# Patient Record
Sex: MALE | Race: WHITE | NOT HISPANIC OR LATINO | Employment: OTHER | URBAN - METROPOLITAN AREA
[De-identification: names, ages, dates, MRNs, and addresses within clinical notes are randomized per-mention and may not be internally consistent; named-entity substitution may affect disease eponyms.]

---

## 2020-09-07 ENCOUNTER — HOSPITAL ENCOUNTER (EMERGENCY)
Facility: HOSPITAL | Age: 60
Discharge: HOME | End: 2020-09-07
Attending: NEUROLOGICAL SURGERY | Admitting: NEUROLOGICAL SURGERY
Payer: MEDICAID

## 2020-09-07 ENCOUNTER — HOSPITAL ENCOUNTER (EMERGENCY)
Facility: HOSPITAL | Age: 60
Discharge: ANOTHER HOSPITAL | End: 2020-09-07
Attending: EMERGENCY MEDICINE
Payer: MEDICAID

## 2020-09-07 VITALS
HEART RATE: 80 BPM | TEMPERATURE: 99 F | BODY MASS INDEX: 36.73 KG/M2 | RESPIRATION RATE: 19 BRPM | HEIGHT: 69 IN | WEIGHT: 248 LBS | DIASTOLIC BLOOD PRESSURE: 96 MMHG | OXYGEN SATURATION: 96 % | SYSTOLIC BLOOD PRESSURE: 169 MMHG

## 2020-09-07 VITALS
TEMPERATURE: 99 F | WEIGHT: 245 LBS | SYSTOLIC BLOOD PRESSURE: 171 MMHG | DIASTOLIC BLOOD PRESSURE: 106 MMHG | OXYGEN SATURATION: 98 % | HEART RATE: 74 BPM | RESPIRATION RATE: 18 BRPM

## 2020-09-07 DIAGNOSIS — H60.502 ACUTE OTITIS EXTERNA OF LEFT EAR, UNSPECIFIED TYPE: Primary | ICD-10-CM

## 2020-09-07 DIAGNOSIS — G93.89 BRAIN MASS: Primary | ICD-10-CM

## 2020-09-07 DIAGNOSIS — R55 SYNCOPE: ICD-10-CM

## 2020-09-07 DIAGNOSIS — M25.529 ELBOW PAIN: ICD-10-CM

## 2020-09-07 DIAGNOSIS — L03.211 FACIAL CELLULITIS: ICD-10-CM

## 2020-09-07 DIAGNOSIS — H66.92 LEFT OTITIS MEDIA, UNSPECIFIED OTITIS MEDIA TYPE: ICD-10-CM
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
## 2020-09-07 LAB
ALBUMIN SERPL BCP-MCNC: 4.1 G/DL (ref 3.5–5.2)
ALP SERPL-CCNC: 61 U/L (ref 38–126)
ALT SERPL W/O P-5'-P-CCNC: 76 U/L (ref 10–44)
ANION GAP SERPL CALC-SCNC: 6 MMOL/L (ref 8–16)
AST SERPL-CCNC: 37 U/L (ref 15–46)
BASOPHILS # BLD AUTO: 0.04 K/UL (ref 0–0.2)
BASOPHILS NFR BLD: 0.5 % (ref 0–1.9)
BILIRUB SERPL-MCNC: 0.8 MG/DL (ref 0.1–1)
BILIRUB UR QL STRIP: NEGATIVE
BUN SERPL-MCNC: 13 MG/DL (ref 2–20)
CALCIUM SERPL-MCNC: 9.1 MG/DL (ref 8.7–10.5)
CHLORIDE SERPL-SCNC: 101 MMOL/L (ref 95–110)
CLARITY UR REFRACT.AUTO: CLEAR
CO2 SERPL-SCNC: 32 MMOL/L (ref 23–29)
COLOR UR AUTO: ABNORMAL
CREAT SERPL-MCNC: 1.14 MG/DL (ref 0.5–1.4)
DIFFERENTIAL METHOD: ABNORMAL
EOSINOPHIL # BLD AUTO: 0.1 K/UL (ref 0–0.5)
EOSINOPHIL NFR BLD: 1.5 % (ref 0–8)
ERYTHROCYTE [DISTWIDTH] IN BLOOD BY AUTOMATED COUNT: 11.9 % (ref 11.5–14.5)
EST. GFR  (AFRICAN AMERICAN): >60 ML/MIN/1.73 M^2
EST. GFR  (NON AFRICAN AMERICAN): >60 ML/MIN/1.73 M^2
GLUCOSE SERPL-MCNC: 94 MG/DL (ref 70–110)
GLUCOSE UR QL STRIP: NEGATIVE
HCT VFR BLD AUTO: 43.6 % (ref 40–54)
HGB BLD-MCNC: 15 G/DL (ref 14–18)
HGB UR QL STRIP: NEGATIVE
IMM GRANULOCYTES # BLD AUTO: 0.02 K/UL (ref 0–0.04)
IMM GRANULOCYTES NFR BLD AUTO: 0.2 % (ref 0–0.5)
KETONES UR QL STRIP: NEGATIVE
LEUKOCYTE ESTERASE UR QL STRIP: NEGATIVE
LYMPHOCYTES # BLD AUTO: 1.9 K/UL (ref 1–4.8)
LYMPHOCYTES NFR BLD: 22.2 % (ref 18–48)
MCH RBC QN AUTO: 31.6 PG (ref 27–31)
MCHC RBC AUTO-ENTMCNC: 34.4 G/DL (ref 32–36)
MCV RBC AUTO: 92 FL (ref 82–98)
MONOCYTES # BLD AUTO: 0.7 K/UL (ref 0.3–1)
MONOCYTES NFR BLD: 8.6 % (ref 4–15)
NEUTROPHILS # BLD AUTO: 5.7 K/UL (ref 1.8–7.7)
NEUTROPHILS NFR BLD: 67 % (ref 38–73)
NITRITE UR QL STRIP: NEGATIVE
NRBC BLD-RTO: 0 /100 WBC
NT-PROBNP: 272 PG/ML (ref 5–900)
PH UR STRIP: 6 [PH] (ref 5–8)
PLATELET # BLD AUTO: 208 K/UL (ref 150–350)
PMV BLD AUTO: 10.1 FL (ref 9.2–12.9)
POCT GLUCOSE: 85 MG/DL (ref 70–110)
POTASSIUM SERPL-SCNC: 3.7 MMOL/L (ref 3.5–5.1)
PROT SERPL-MCNC: 7.4 G/DL (ref 6–8.4)
PROT UR QL STRIP: NEGATIVE
RBC # BLD AUTO: 4.75 M/UL (ref 4.6–6.2)
SARS-COV-2 RDRP RESP QL NAA+PROBE: NEGATIVE
SODIUM SERPL-SCNC: 139 MMOL/L (ref 136–145)
SP GR UR STRIP: 1.02 (ref 1–1.03)
TROPONIN I SERPL DL<=0.01 NG/ML-MCNC: <0.012 NG/ML (ref 0.01–0.03)
URN SPEC COLLECT METH UR: ABNORMAL
UROBILINOGEN UR STRIP-ACNC: NEGATIVE EU/DL
WBC # BLD AUTO: 8.51 K/UL (ref 3.9–12.7)

## 2020-09-07 PROCEDURE — 93005 ELECTROCARDIOGRAM TRACING: CPT | Mod: ER

## 2020-09-07 PROCEDURE — 99285 PR EMERGENCY DEPT VISIT,LEVEL V: ICD-10-PCS | Mod: ,,, | Performed by: EMERGENCY MEDICINE

## 2020-09-07 PROCEDURE — 93010 EKG 12-LEAD: ICD-10-PCS | Mod: ,,, | Performed by: INTERNAL MEDICINE

## 2020-09-07 PROCEDURE — 84484 ASSAY OF TROPONIN QUANT: CPT | Mod: ER

## 2020-09-07 PROCEDURE — 80053 COMPREHEN METABOLIC PANEL: CPT | Mod: ER

## 2020-09-07 PROCEDURE — 85025 COMPLETE CBC W/AUTO DIFF WBC: CPT | Mod: ER

## 2020-09-07 PROCEDURE — 99285 EMERGENCY DEPT VISIT HI MDM: CPT | Mod: ,,, | Performed by: EMERGENCY MEDICINE

## 2020-09-07 PROCEDURE — 99285 EMERGENCY DEPT VISIT HI MDM: CPT | Mod: 25

## 2020-09-07 PROCEDURE — 93010 ELECTROCARDIOGRAM REPORT: CPT | Mod: ,,, | Performed by: INTERNAL MEDICINE

## 2020-09-07 PROCEDURE — U0002 COVID-19 LAB TEST NON-CDC: HCPCS | Mod: ER

## 2020-09-07 PROCEDURE — 63600175 PHARM REV CODE 636 W HCPCS: Mod: ER | Performed by: PHYSICIAN ASSISTANT

## 2020-09-07 PROCEDURE — 83880 ASSAY OF NATRIURETIC PEPTIDE: CPT | Mod: ER

## 2020-09-07 PROCEDURE — 99285 EMERGENCY DEPT VISIT HI MDM: CPT | Mod: 25,27,ER

## 2020-09-07 PROCEDURE — 25500020 PHARM REV CODE 255: Performed by: EMERGENCY MEDICINE

## 2020-09-07 PROCEDURE — 25000003 PHARM REV CODE 250: Mod: ER | Performed by: EMERGENCY MEDICINE

## 2020-09-07 PROCEDURE — 99203 OFFICE O/P NEW LOW 30 MIN: CPT | Mod: ,,, | Performed by: NEUROLOGICAL SURGERY

## 2020-09-07 PROCEDURE — 96372 THER/PROPH/DIAG INJ SC/IM: CPT | Mod: 59,ER

## 2020-09-07 PROCEDURE — A9585 GADOBUTROL INJECTION: HCPCS | Performed by: EMERGENCY MEDICINE

## 2020-09-07 PROCEDURE — 25000003 PHARM REV CODE 250: Mod: ER | Performed by: PHYSICIAN ASSISTANT

## 2020-09-07 PROCEDURE — 81003 URINALYSIS AUTO W/O SCOPE: CPT | Mod: ER

## 2020-09-07 PROCEDURE — 99203 PR OFFICE/OUTPT VISIT, NEW, LEVL III, 30-44 MIN: ICD-10-PCS | Mod: ,,, | Performed by: NEUROLOGICAL SURGERY

## 2020-09-07 RX ORDER — CLINDAMYCIN PHOSPHATE 150 MG/ML
600 INJECTION, SOLUTION INTRAVENOUS
Status: COMPLETED | OUTPATIENT
Start: 2020-09-07 | End: 2020-09-07

## 2020-09-07 RX ORDER — CEFTRIAXONE 250 MG/1
1000 INJECTION, POWDER, FOR SOLUTION INTRAMUSCULAR; INTRAVENOUS
Status: COMPLETED | OUTPATIENT
Start: 2020-09-07 | End: 2020-09-07

## 2020-09-07 RX ORDER — CIPROFLOXACIN AND DEXAMETHASONE 3; 1 MG/ML; MG/ML
4 SUSPENSION/ DROPS AURICULAR (OTIC) 2 TIMES DAILY
Qty: 7.5 ML | Refills: 0 | Status: SHIPPED | OUTPATIENT
Start: 2020-09-07 | End: 2020-12-03 | Stop reason: ALTCHOICE

## 2020-09-07 RX ORDER — GADOBUTROL 604.72 MG/ML
10 INJECTION INTRAVENOUS
Status: COMPLETED | OUTPATIENT
Start: 2020-09-07 | End: 2020-09-07

## 2020-09-07 RX ADMIN — CEFTRIAXONE SODIUM 1000 MG: 250 INJECTION, POWDER, FOR SOLUTION INTRAMUSCULAR; INTRAVENOUS at 12:09

## 2020-09-07 RX ADMIN — GADOBUTROL 10 ML: 604.72 INJECTION INTRAVENOUS at 09:09

## 2020-09-07 RX ADMIN — CLINDAMYCIN PHOSPHATE 600 MG: 150 INJECTION, SOLUTION INTRAMUSCULAR; INTRAVENOUS at 12:09

## 2020-09-07 RX ADMIN — SODIUM CHLORIDE 1000 ML: 0.9 INJECTION, SOLUTION INTRAVENOUS at 02:09

## 2020-09-07 NOTE — ED NOTES
Hourly rounding complete. Patient resting in stretcher and is in NAD at this time. Pt is awake alert and oriented x4, VSS. Pt updated on POC. Bed low and locked, SR up x2, call bell in patient reach. Repositions self in stretcher for comfort. Pt voices no needs at this time.

## 2020-09-07 NOTE — ED PROVIDER NOTES
Encounter Date: 9/7/2020       History     Chief Complaint   Patient presents with    Transfer     Transfer from [facility], seen for left ear pina, syncope after IM injection, + head trama right elbow, CT shos Left sided MASS     Orlando Franklin is a 59 M hx of hypertension transferred from [facility] for abnormal CT head.  Patient states he was seen today for left ear pain which has been constant for the past 3 days.  Pain is been constant, feels like he cannot hear out of his left ear.  He tried ear drops last night which did not improve symptoms.  After being evaluated, patient was given injection which caused him to pass out.  He fell hitting his head and right elbow.  CT head was performed which shows a vague mass in the left medial temporal lobe with surrounding edema.  Patient was transferred for MRI and possible neuro surgical evaluation.  He currently denies headache, change in vision, memory change, personality change, loss of time.  No unintentional weight loss.  No numbness tingling of the arms or the legs.  He does report some fatigue.        Review of patient's allergies indicates:   Allergen Reactions    Ginger     Poison ivy [vit e-nonoxynol 9-aloe vera]      Past Medical History:   Diagnosis Date    Hypertension      History reviewed. No pertinent surgical history.  History reviewed. No pertinent family history.  Social History     Tobacco Use    Smoking status: Never Smoker    Smokeless tobacco: Never Used   Substance Use Topics    Alcohol use: Yes     Comment: mark 1-2 drink     Drug use: Not Currently     Review of Systems   Constitutional: Positive for fatigue. Negative for diaphoresis and fever.   HENT: Positive for ear pain. Negative for ear discharge and sore throat.    Respiratory: Negative for cough and shortness of breath.    Cardiovascular: Negative for chest pain.   Gastrointestinal: Negative for abdominal pain and nausea.   Genitourinary: Negative for dysuria.    Musculoskeletal: Negative for back pain and gait problem.   Skin: Negative for rash.   Neurological: Negative for weakness.   Hematological: Does not bruise/bleed easily.       Physical Exam     Initial Vitals [09/07/20 1631]   BP Pulse Resp Temp SpO2   (!) 156/93 81 16 98.9 °F (37.2 °C) 97 %      MAP       --         Physical Exam    Nursing note and vitals reviewed.  Constitutional: He appears well-developed. No distress.   HENT:   Right Ear: Hearing, tympanic membrane, external ear and ear canal normal. No mastoid tenderness.   Left Ear: There is swelling. No mastoid tenderness. Decreased hearing is noted.   Mouth/Throat: Oropharynx is clear and moist.   Eyes: Conjunctivae and EOM are normal. Pupils are equal, round, and reactive to light.   Neck: Neck supple.   Cardiovascular: Normal rate, regular rhythm and intact distal pulses.   Pulmonary/Chest: Breath sounds normal. No stridor. He has no wheezes. He has no rales.   Abdominal: Soft. Bowel sounds are normal. There is no abdominal tenderness. There is no rebound.   Musculoskeletal: No edema.   Neurological: He is alert and oriented to person, place, and time. He has normal strength. No cranial nerve deficit or sensory deficit.   Visual fields intact.  No extremity drift   Skin: Skin is warm. No rash noted.   Psychiatric: He has a normal mood and affect. Thought content normal.         ED Course   Procedures  Labs Reviewed - No data to display       Imaging Results    None          Medical Decision Making:   History:   Old Medical Records: I decided to obtain old medical records.  Old Records Summarized: records from previous admission(s).       <> Summary of Records: Labs from previous hospital reviewed with no acute process.  Initial Assessment:   Emergent evaluation 59-year-old male presenting with left ear pain, syncopized at freestanding ER after injection, with resultant head CT showing possible vague mass.  He is here for further evaluation.  Vital  signs reviewed, mild hypertension otherwise stable.  Well appearing, no acute distress.  Neurologically intact  Differential Diagnosis:   Mass, metastatic disease, abscess, artifact  Clinical Tests:   Lab Tests: Ordered and Reviewed  Radiological Study: Ordered and Reviewed  Medical Tests: Reviewed and Ordered  ED Management:  Discussed with Neurosurgery, he will evaluate the patient.  MRI with and without contrast ordered.    8:45 PM  Pt signed out to Dr. Briceno stable condition pending MRI brain, neurosurgery consult and final disposition.   Other:   I have discussed this case with another health care provider.       <> Summary of the Discussion: Neurosurgery                                 Clinical Impression:       ICD-10-CM ICD-9-CM   1. Acute otitis externa of left ear, unspecified type  H60.502 380.10                                Dianna Miles MD  09/10/20 1643
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
## 2020-09-07 NOTE — ED PROVIDER NOTES
Chief Complaint  Chief Complaint   Patient presents with    Otalgia     left ear pressure and it feels swollen shut.        HPI  Orlando Franklin is a 59 y.o. male who presents with left ear pain and swelling.  Patient reports that he has had some drainage from the left ear.  He has not had any fever or vomiting or diarrhea.  No trauma.  He reports this is gradually worsening over the last several days.  He has mild discomfort but it is more a sensation of fullness and swelling.  No exacerbating or relieving factors.  Patient does report some mild drainage from the ear canal but it is not bloody or cuba pus.  He may have had this many years ago but he is unsure with the previous course resulted in or a diagnosis or treatment.  No other deficits or symptoms at home.  No particular weakness or falls.    Past medical history  Past Medical History:   Diagnosis Date    Hypertension        Current Medications  No current facility-administered medications for this encounter.     Current Outpatient Medications:     LISINOPRIL-HYDROCHLOROTHIAZIDE ORAL, Take by mouth., Disp: , Rfl:     ciprofloxacin-dexamethasone 0.3-0.1% (CIPRODEX) 0.3-0.1 % DrpS, Place 4 drops into the left ear 2 (two) times daily., Disp: 7.5 mL, Rfl: 0    Allergies  Review of patient's allergies indicates:   Allergen Reactions    Ginger     Poison ivy [vit e-nonoxynol 9-aloe vera]        Surgical history  History reviewed. No pertinent surgical history.    Social history  Social History     Socioeconomic History    Marital status:      Spouse name: Not on file    Number of children: Not on file    Years of education: Not on file    Highest education level: Not on file   Occupational History    Not on file   Social Needs    Financial resource strain: Not on file    Food insecurity     Worry: Not on file     Inability: Not on file    Transportation needs     Medical: Not on file     Non-medical: Not on file   Tobacco Use    Smoking status:  Never Smoker    Smokeless tobacco: Never Used   Substance and Sexual Activity    Alcohol use: Yes     Comment: mark 1-2 drink     Drug use: Not Currently    Sexual activity: Not on file   Lifestyle    Physical activity     Days per week: Not on file     Minutes per session: Not on file    Stress: Not on file   Relationships    Social connections     Talks on phone: Not on file     Gets together: Not on file     Attends Congregation service: Not on file     Active member of club or organization: Not on file     Attends meetings of clubs or organizations: Not on file     Relationship status: Not on file   Other Topics Concern    Not on file   Social History Narrative    Not on file       Family History  History reviewed. No pertinent family history.    Review of systems  Respiratory: No wheezing, cough, or shortness of breath.  Cardiovascular: No chest pain or palpitations.  GI: No abdominal pain, nausea, vomiting, or diarrhea.  All systems otherwise negative except as noted in ROS and HPI    Physical Exam  Vital signs: BP (!) 171/106   Pulse 74   Temp 98.7 °F (37.1 °C) (Oral)   Resp 18   Wt 111.1 kg (245 lb)   SpO2 98%   Constitutional: No acute distress.  Well developed, alert, oriented and appropriate.  HENT: Normocephalic, atraumatic.  There is left ear redness and edema consistent with mild cellulitis.  No abscess.  No significant tenderness.  No mastoiditis.  Ear canal does appear congested but not consistent with otitis externa.  No pain with movement of the auricle.  There does appear to be otitis media but his canal is tight and it is difficult to see fully.  Eyes: PERRL, EOMI, normal conjunctiva.  Neck: Normal range of motion, no tenderness; supple.  Respiratory: Nonlabored breathing with normal breath sounds.  Cardiovascular: RRR with no pulse deficit.  GI: Soft, nontender, no rebound or guarding.  Musculoskeletal: Normal ROM, no tenderness, injury, or edema.  Skin: Warm, dry skin without  infection or injury.  Neurologic: Normal motor, sensation with no new focal deficit.  Psychiatric: Affect normal, judgement normal, mood normal.  No SI, HI, and not gravely disabled.    Labs  Pertinent labs reviewed (see chart for details)  Labs Reviewed   CBC W/ AUTO DIFFERENTIAL - Abnormal; Notable for the following components:       Result Value    Mean Corpuscular Hemoglobin 31.6 (*)     All other components within normal limits   COMPREHENSIVE METABOLIC PANEL - Abnormal; Notable for the following components:    CO2 32 (*)     ALT 76 (*)     Anion Gap 6 (*)     All other components within normal limits   URINALYSIS, REFLEX TO URINE CULTURE - Abnormal; Notable for the following components:    Color, UA Brown (*)     All other components within normal limits    Narrative:     Specimen Source->Urine   NT-PRO NATRIURETIC PEPTIDE   TROPONIN I   SARS-COV-2 RNA AMPLIFICATION, QUAL   POCT GLUCOSE       ECG  Results for orders placed or performed during the hospital encounter of 09/07/20   EKG 12-lead    Collection Time: 09/07/20 12:26 PM    Narrative    Test Reason : R55,    Vent. Rate : 074 BPM     Atrial Rate : 074 BPM     P-R Int : 198 ms          QRS Dur : 098 ms      QT Int : 398 ms       P-R-T Axes : 055 -15 002 degrees     QTc Int : 441 ms    Normal sinus rhythm  Minimal voltage criteria for LVH, may be normal variant  Borderline Abnormal ECG  No previous ECGs available  Confirmed by Juanjo STARK MD, Bull ZAMORA ([id number]) on 9/8/2020 8:43:43 AM    Referred By: JOANNE   SELF           Confirmed By:Bull Geiger III, MD     ECG interpreted by ED MD    Radiology    X-Ray Cervical Spine AP And Lateral   Final Result      1.  Degenerative changes, greatest from C5 through C7.  No acute bony abnormality suggested.         Electronically signed by: Joey Saleem MD   Date:    09/07/2020   Time:    13:37      CT Head Without Contrast   Final Result   Addendum 1 of 1      Of note, the patient had a follow-up MRI brain  performed later the same    day which was normal.      The findings on the current CT scan were later shown to be artifact from a    defective tube on the CT scanner.  This caused the masslike appearance in    the left cerebral hemisphere.  This tube is currently being replaced.      Impression: As above         Electronically signed by: Joey Saleem MD   Date:    09/11/2020   Time:    14:05      Final      X-Ray Elbow Complete Right   Final Result      No acute bony abnormality suggested.         Electronically signed by: Joey Saleem MD   Date:    09/07/2020   Time:    13:12          Procedures  Procedures    Medications   clindamycin injection 600 mg (600 mg Intramuscular Given 9/7/20 1204)   cefTRIAXone injection 1,000 mg (1,000 mg Intramuscular Given 9/7/20 1200)   sodium chloride 0.9% bolus 1,000 mL (1,000 mLs Intravenous New Bag 9/7/20 1448)       ED course and medical decision making    ED Course as of Sep 14 0616   Mon Sep 07, 2020   1203 After patient received 3rd IM shot of abx, RN reports that the patient said he was feeling dizzy and subsequently had a syncopal episode hitting his head and elbow. Patient was unresponsive for about 30-45 seconds. Once patient awoken, he was AAOx3. No overt signs of trauma. Ordered CT head and x-rays.     [EM]   1300 CT head concerning for brain mass, negative for bleed/fractures. Patient care transferred to Dr. Gastelum.     [EM]   1359 D/w Neuro on call    [MB]   1426 EKG shows normal sinus rhythm rate of 74 beats per minute with no ST elevation MI.  Normal QTC    [MB]      ED Course User Index  [EM] Kellie Kaur PA-C  [MB] Gary Gastelum MD       While getting his injections of antibiotics for presumed otitis media and cellulitis, the patient had a syncopal episode.  He did strike his head and a CT scan was ordered.  No fracture or acute bleed seen.  Brain mass detected.  I discussed the case with neurology and Hospital Medicine and they do both  agree that patient may benefit from inpatient evaluation at this time.  [facility] wishes that neuro surgery be available and do not feel he is a candidate for their hospital because they do not have neurosurgery.  Will place a regional referral center referral for transfer to [facility] or other suitable facility    Disposition    Patient transferred in stable condition      Final impression  1. Brain mass    2. Elbow pain    3. Syncope    4. Fall    5. Left otitis media, unspecified otitis media type    6. Facial cellulitis        Critical care time spent with this patient was 30 minutes excluding the procedure time.          Gary Gastelum MD  09/07/20 1414       Gary Gastelum MD  09/07/20 1659       Gary Gastelum MD  09/14/20 0616

## 2020-09-07 NOTE — ED TRIAGE NOTES
Orlando Franklin, a 59 y.o. male presents to the ED via EMS transfer from [facility] with CC fall with head injury, reports passed out getting an injection by ENT for right ear pain x3 days. CT showed possible brain mass. Neuro intact. Denies HA dizziness or blurred vision. C/o neck soreness. Right elbow abraision and swelling noted, no active bleeding.  Also c/o lower back soreness.    Patient identifiers verified verbally with patient and correct for Orlando Franklin.    SKIN: Skin is warm dry and intact, and color is consistent with ethnicity. Capillary refill <3 seconds. No breakdown or brusing visible. Mucus membranes moist, acyanotic.  RESPIRATORY: Airway is open and patent. Respirations-spontaneous, unlabored, regular rate, equal bilaterally on inspiration and expiration. No accessory muscle use noted. Lungs clear to auscultation in all fields bilaterally anterior and posterior.   CARDIAC: Patient has regular heart rate.  No peripheral edema noted, and patient has no c/o chest pain. Peripheral pulses present equal and strong throughout.  ABDOMEN: Soft and non-tender to palpation with no distention noted. Normoactive bowel sounds x4 quadrants. Pt has no complaints of abnormal bowel movements, denies blood in stool. Pt reports normal appetite.   NEUROLOGIC: AAOx4. Eyes open spontaneously and facial expression symmetrical. Pt behavior appropriate to situation, and pt follows commands. Pt reports sensation present in all extremities when touched with a finger, denies any numbness or tingling. PERRLA  MUSCULOSKELETAL: Spontaneous movement noted to all extremities. Hand  equal and leg strength strong +5 bilaterally.   : No complaints of frequency, burning, urgency or blood in the urine. No complaints of incontinence.

## 2020-09-07 NOTE — ED NOTES
After giving the patient his third intramuscular injection he became light headed and dizzy and fell backwards hitting his head and right elbow. Patient had a syncopal episode and was unresponsive for approximately 30-45 seconds. Upon waking up the pt was awake and alert x4. Pt states he remember having his third shot and then becoming dizzy and that is the last thing he remembers.

## 2020-09-08 ENCOUNTER — TELEPHONE (OUTPATIENT)
Dept: EMERGENCY MEDICINE | Facility: HOSPITAL | Age: 60
End: 2020-09-08

## 2020-09-08 NOTE — DISCHARGE INSTRUCTIONS
The MRI and repeat CT scan of your head are unremarkable.  You do have signs of otitis externa and should use the Ciprodex ear drops as prescribed.    Seek immediate medical attention if you have new or worsening symptoms, or for any other concern.

## 2020-09-08 NOTE — SUBJECTIVE & OBJECTIVE
(Not in a hospital admission)      Review of patient's allergies indicates:   Allergen Reactions    Ginger     Poison ivy [vit e-nonoxynol 9-aloe vera]        Past Medical History:   Diagnosis Date    Hypertension      History reviewed. No pertinent surgical history.  Family History     None        Tobacco Use    Smoking status: Never Smoker    Smokeless tobacco: Never Used   Substance and Sexual Activity    Alcohol use: Yes     Comment: mark 1-2 drink     Drug use: Not Currently    Sexual activity: Not on file     Review of Systems   Constitutional: Negative.    HENT: Positive for ear pain and hearing loss. Negative for rhinorrhea and tinnitus.    Eyes: Negative for photophobia and pain.   Respiratory: Negative.    Cardiovascular: Negative for chest pain.   Endocrine: Negative.    Musculoskeletal: Positive for neck pain.   Neurological: Positive for syncope. Negative for seizures, speech difficulty and headaches.     Objective:     Weight: 112.5 kg (248 lb)  Body mass index is 36.62 kg/m².  Vital Signs (Most Recent):  Temp: 98.9 °F (37.2 °C) (09/07/20 1631)  Pulse: 78 (09/07/20 2201)  Resp: 20 (09/07/20 2201)  BP: (!) 154/85 (09/07/20 2031)  SpO2: 98 % (09/07/20 2201) Vital Signs (24h Range):  Temp:  [98.7 °F (37.1 °C)-99 °F (37.2 °C)] 98.9 °F (37.2 °C)  Pulse:  [70-87] 78  Resp:  [11-20] 20  SpO2:  [95 %-99 %] 98 %  BP: (150-177)/() 154/85                          Neurosurgery Physical Exam     GENERAL: appears stated age, well nourished, NAD  HEENT: NCAT, mucous membranes moist  NECK: supple, very mild TTP of cervical paraspinal musculature   CV: extremities warm  PULM: normal expansion    NEURO:  GCS 15  AAO x 3  CN II-XII grossly intact  PERRL, EOMi  fc x 4, FS  SILT    No drift or dysmetria      Significant Labs:  Recent Labs   Lab 09/07/20  1417   GLU 94      K 3.7      CO2 32*   BUN 13   CREATININE 1.14   CALCIUM 9.1     Recent Labs   Lab 09/07/20  1417   WBC 8.51   HGB 15.0   HCT  43.6        No results for input(s): LABPT, INR, APTT in the last 48 hours.  Microbiology Results (last 7 days)     ** No results found for the last 168 hours. **        All pertinent labs from the last 24 hours have been reviewed.    Significant Diagnostics:  I have reviewed all pertinent imaging results/findings within the past 24 hours.   X-ray Cervical Spine Ap And Lateral    Result Date: 9/7/2020  1.  Degenerative changes, greatest from C5 through C7.  No acute bony abnormality suggested. Electronically signed by: Joey Saleem MD Date:    09/07/2020 Time:    13:37    X-ray Elbow Complete Right    Result Date: 9/7/2020  No acute bony abnormality suggested. Electronically signed by: Joey Saleem MD Date:    09/07/2020 Time:    13:12    Ct Head Without Contrast    Result Date: 9/7/2020  No acute intracranial process. Case discussed with  on 09/07/2020 at 22:20. Electronically signed by: Nahid Mclaughlin MD Date:    09/07/2020 Time:    22:33    Ct Head Without Contrast    Result Date: 9/7/2020  1.  No definite evidence of a significant/acute intracranial hemorrhage.  No acute/depressed skull fracture. 2.  Suspicion of vague mass in the left medial temporal region with surrounding edema as described.  Minimal white matter changes elsewhere.  Question a 2nd focus left frontal region.  Brain tumor?  Metastatic lesion?  Recommend scheduling the patient for an MRI of the brain pre and post contrast administration. All CT scans at this facility are performed  using dose modulation techniques as appropriate to performed exam including the following:  automated exposure control; adjustment of mA and/or kV according to the patients size (this includes techniques or standardized protocols for targeted exams where dose is matched to indication/reason for exam: i.e. extremities or head);  iterative reconstruction technique. Electronically signed by: Joey Saleem MD Date:    09/07/2020  Time:    13:12    Mri Brain W Wo Contrast    Result Date: 9/7/2020  Unremarkable contrast enhanced MRI of the brain. Bilateral mastoid effusions with left greater than right. Please see the repeat CT scan of the head for additional details. Case discussed with Dr. Briceno on 09/07/2020 at 22:20. Electronically signed by resident: Roderick Jaramillo Date:    09/07/2020 Time:    21:24 Electronically signed by: Nahid Mclaughlin MD Date:    09/07/2020 Time:    22:36

## 2020-09-08 NOTE — HPI
59M with h/o HTN presents as transfer from OSH for L temporal mass found on CTH during workup for syncopal episode with mechanical GLF. Pt has been having L ear pain x 3 day which was dx as acute otitis media at outside ED. He was given three injections, and while receiving one of these, he experienced LOC with prodromal diaphoresis and palpitations. ED personnel reported that he fell, striking his head and elbow on the floor, and sustainced LOC for <1 minute. Upon awaking, he was fully alert and oriented. He is not currently on any AC/AP, and has no h/o prior syncopal episodes. He denies N/V, changes in vision/hearing, confusion, focal weakness or numbness.

## 2020-09-08 NOTE — CONSULTS
[facility]  Neurosurgery  Consult Note    Consults  Subjective:     Chief Complaint/Reason for Admission: Fall    History of Present Illness: 59M with h/o HTN presents as transfer from OSH for L temporal mass found on CTH during workup for syncopal episode with mechanical GLF. Pt has been having L ear pain x 3 day which was dx as acute otitis media at outside ED. He was given three injections, and while receiving one of these, he experienced LOC with prodromal diaphoresis and palpitations. ED personnel reported that he fell, striking his head and elbow on the floor, and sustainced LOC for <1 minute. Upon awaking, he was fully alert and oriented. He is not currently on any AC/AP, and has no h/o prior syncopal episodes. He denies N/V, changes in vision/hearing, confusion, focal weakness or numbness.    (Not in a hospital admission)      Review of patient's allergies indicates:   Allergen Reactions    Ginger     Poison ivy [vit e-nonoxynol 9-aloe vera]        Past Medical History:   Diagnosis Date    Hypertension      History reviewed. No pertinent surgical history.  Family History     None        Tobacco Use    Smoking status: Never Smoker    Smokeless tobacco: Never Used   Substance and Sexual Activity    Alcohol use: Yes     Comment: mark 1-2 drink     Drug use: Not Currently    Sexual activity: Not on file     Review of Systems   Constitutional: Negative.    HENT: Positive for ear pain and hearing loss. Negative for rhinorrhea and tinnitus.    Eyes: Negative for photophobia and pain.   Respiratory: Negative.    Cardiovascular: Negative for chest pain.   Endocrine: Negative.    Musculoskeletal: Positive for neck pain.   Neurological: Positive for syncope. Negative for seizures, speech difficulty and headaches.     Objective:     Weight: 112.5 kg (248 lb)  Body mass index is 36.62 kg/m².  Vital Signs (Most Recent):  Temp: 98.9 °F (37.2 °C) (09/07/20 1631)  Pulse: 78 (09/07/20 2201)  Resp:  20 (09/07/20 2201)  BP: (!) 154/85 (09/07/20 2031)  SpO2: 98 % (09/07/20 2201) Vital Signs (24h Range):  Temp:  [98.7 °F (37.1 °C)-99 °F (37.2 °C)] 98.9 °F (37.2 °C)  Pulse:  [70-87] 78  Resp:  [11-20] 20  SpO2:  [95 %-99 %] 98 %  BP: (150-177)/() 154/85                          Neurosurgery Physical Exam     GENERAL: appears stated age, well nourished, NAD  HEENT: NCAT, mucous membranes moist  NECK: supple, very mild TTP of cervical paraspinal musculature   CV: extremities warm  PULM: normal expansion    NEURO:  GCS 15  AAO x 3  CN II-XII grossly intact  PERRL, EOMi  fc x 4, FS  SILT    No drift or dysmetria      Significant Labs:  Recent Labs   Lab 09/07/20  1417   GLU 94      K 3.7      CO2 32*   BUN 13   CREATININE 1.14   CALCIUM 9.1     Recent Labs   Lab 09/07/20  1417   WBC 8.51   HGB 15.0   HCT 43.6        No results for input(s): LABPT, INR, APTT in the last 48 hours.  Microbiology Results (last 7 days)     ** No results found for the last 168 hours. **        All pertinent labs from the last 24 hours have been reviewed.    Significant Diagnostics:  I have reviewed all pertinent imaging results/findings within the past 24 hours.   X-ray Cervical Spine Ap And Lateral    Result Date: 9/7/2020  1.  Degenerative changes, greatest from C5 through C7.  No acute bony abnormality suggested. Electronically signed by: Joey Saleem MD Date:    09/07/2020 Time:    13:37    X-ray Elbow Complete Right    Result Date: 9/7/2020  No acute bony abnormality suggested. Electronically signed by: Joey Saleem MD Date:    09/07/2020 Time:    13:12    Ct Head Without Contrast    Result Date: 9/7/2020  No acute intracranial process. Case discussed with  on 09/07/2020 at 22:20. Electronically signed by: Nahid Mclaughlin MD Date:    09/07/2020 Time:    22:33    Ct Head Without Contrast    Result Date: 9/7/2020  1.  No definite evidence of a significant/acute intracranial hemorrhage.  No  acute/depressed skull fracture. 2.  Suspicion of vague mass in the left medial temporal region with surrounding edema as described.  Minimal white matter changes elsewhere.  Question a 2nd focus left frontal region.  Brain tumor?  Metastatic lesion?  Recommend scheduling the patient for an MRI of the brain pre and post contrast administration. All CT scans at this facility are performed  using dose modulation techniques as appropriate to performed exam including the following:  automated exposure control; adjustment of mA and/or kV according to the patients size (this includes techniques or standardized protocols for targeted exams where dose is matched to indication/reason for exam: i.e. extremities or head);  iterative reconstruction technique. Electronically signed by: Joey Saleem MD Date:    09/07/2020 Time:    13:12    Mri Brain W Wo Contrast    Result Date: 9/7/2020  Unremarkable contrast enhanced MRI of the brain. Bilateral mastoid effusions with left greater than right. Please see the repeat CT scan of the head for additional details. Case discussed with Dr. Briceno on 09/07/2020 at 22:20. Electronically signed by resident: Roderick Jaramillo Date:    09/07/2020 Time:    21:24 Electronically signed by: Nahid Mclaughlin MD Date:    09/07/2020 Time:    22:36      Assessment/Plan:     Fall  59M with h/o HTN presents with L AOM diagnosed at OSH ED. He received Abx injection there, and experienced a syncopal episode with prodromal diaphoresis.     --All labs and diagnostics reviewed.  --CTH from OSH demonstrates L temporal hyperdensity with surrounding hypoattenuation.   --MRI and repeat CTH unremarkable.   --CT Csp demonstrates no visible fracture, distraction/dislocation injury.  --CBC/BMP grossly wnl.   --Symptoms are consistent with vasovagal episode. Recommend outpt workup with PCP.   --No NSY intervention. Please page with any neuro exam change or questions.   --NSY will sign off. Thank you for this  interesting consult.     Dispo: per primary    Discussed with Dr. Childs.         Thank you for your consult. I will sign off. Please contact us if you have any additional questions.    Elmo Frederick MD  Neurosurgery  [facility]

## 2020-09-08 NOTE — TELEPHONE ENCOUNTER
Patient called informing his insurance would not cover Ciprodex gtt from his ED visit yesterday. Diagnosed with OAC. I called in Ciprofloxacin gtt to his preferred pharmacy ([facility] [phone])
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
## 2020-11-20 ENCOUNTER — HOSPITAL ENCOUNTER (EMERGENCY)
Facility: HOSPITAL | Age: 60
Discharge: HOME | End: 2020-11-20
Attending: FAMILY MEDICINE
Payer: MEDICAID

## 2020-11-20 VITALS
WEIGHT: 250 LBS | OXYGEN SATURATION: 97 % | SYSTOLIC BLOOD PRESSURE: 165 MMHG | DIASTOLIC BLOOD PRESSURE: 100 MMHG | BODY MASS INDEX: 35.79 KG/M2 | HEART RATE: 75 BPM | RESPIRATION RATE: 20 BRPM | HEIGHT: 70 IN | TEMPERATURE: 98 F

## 2020-11-20 DIAGNOSIS — S61.411A LACERATION OF RIGHT HAND WITHOUT FOREIGN BODY, INITIAL ENCOUNTER: Primary | ICD-10-CM

## 2020-11-20 DIAGNOSIS — M79.643 HAND PAIN: ICD-10-CM

## 2020-11-20 DIAGNOSIS — M79.639 FOREARM PAIN: ICD-10-CM

## 2020-11-20 PROCEDURE — 12001 RPR S/N/AX/GEN/TRNK 2.5CM/<: CPT | Mod: ER

## 2020-11-20 PROCEDURE — 90715 TDAP VACCINE 7 YRS/> IM: CPT | Mod: SL,ER | Performed by: PHYSICIAN ASSISTANT

## 2020-11-20 PROCEDURE — 99284 EMERGENCY DEPT VISIT MOD MDM: CPT | Mod: 25,ER

## 2020-11-20 PROCEDURE — 90471 IMMUNIZATION ADMIN: CPT | Mod: VFC,ER | Performed by: PHYSICIAN ASSISTANT

## 2020-11-20 PROCEDURE — 63600175 PHARM REV CODE 636 W HCPCS: Mod: SL,ER | Performed by: PHYSICIAN ASSISTANT

## 2020-11-20 PROCEDURE — 25000003 PHARM REV CODE 250: Mod: ER | Performed by: PHYSICIAN ASSISTANT

## 2020-11-20 RX ORDER — LIDOCAINE HYDROCHLORIDE 10 MG/ML
10 INJECTION, SOLUTION EPIDURAL; INFILTRATION; INTRACAUDAL; PERINEURAL
Status: COMPLETED | OUTPATIENT
Start: 2020-11-20 | End: 2020-11-20

## 2020-11-20 RX ADMIN — CLOSTRIDIUM TETANI TOXOID ANTIGEN (FORMALDEHYDE INACTIVATED), CORYNEBACTERIUM DIPHTHERIAE TOXOID ANTIGEN (FORMALDEHYDE INACTIVATED), BORDETELLA PERTUSSIS TOXOID ANTIGEN (GLUTARALDEHYDE INACTIVATED), BORDETELLA PERTUSSIS FILAMENTOUS HEMAGGLUTININ ANTIGEN (FORMALDEHYDE INACTIVATED), BORDETELLA PERTUSSIS PERTACTIN ANTIGEN, AND BORDETELLA PERTUSSIS FIMBRIAE 2/3 ANTIGEN 0.5 ML: 5; 2; 2.5; 5; 3; 5 INJECTION, SUSPENSION INTRAMUSCULAR at 03:11

## 2020-11-20 RX ADMIN — LIDOCAINE HYDROCHLORIDE 100 MG: 10 INJECTION, SOLUTION EPIDURAL; INFILTRATION; INTRACAUDAL; PERINEURAL at 04:11
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
## 2020-11-20 NOTE — DISCHARGE INSTRUCTIONS
Your x-rays did not reveal any evidence of fracture or dislocation.  You are advised to rest, elevate and ice the extremity.  You are instructed to follow-up with  Your primary care provider for re-evaluation and to return to the emergency department immediately for any new or worsening symptoms.

## 2020-11-21 ENCOUNTER — HOSPITAL ENCOUNTER (EMERGENCY)
Facility: HOSPITAL | Age: 60
Discharge: HOME | End: 2020-11-21
Attending: EMERGENCY MEDICINE
Payer: MEDICAID

## 2020-11-21 VITALS
TEMPERATURE: 99 F | WEIGHT: 250 LBS | OXYGEN SATURATION: 99 % | BODY MASS INDEX: 37.03 KG/M2 | DIASTOLIC BLOOD PRESSURE: 91 MMHG | HEIGHT: 69 IN | RESPIRATION RATE: 16 BRPM | HEART RATE: 90 BPM | SYSTOLIC BLOOD PRESSURE: 154 MMHG

## 2020-11-21 DIAGNOSIS — S61.411D LACERATION OF RIGHT HAND WITHOUT FOREIGN BODY, SUBSEQUENT ENCOUNTER: Primary | ICD-10-CM

## 2020-11-21 PROCEDURE — 99283 EMERGENCY DEPT VISIT LOW MDM: CPT | Mod: ER

## 2020-11-21 PROCEDURE — 25000003 PHARM REV CODE 250: Mod: ER | Performed by: EMERGENCY MEDICINE

## 2020-11-21 RX ORDER — CEPHALEXIN 500 MG/1
500 CAPSULE ORAL EVERY 12 HOURS
Qty: 14 CAPSULE | Refills: 0 | Status: SHIPPED | OUTPATIENT
Start: 2020-11-21 | End: 2020-11-28

## 2020-11-21 RX ORDER — CEPHALEXIN 500 MG/1
500 CAPSULE ORAL
Status: COMPLETED | OUTPATIENT
Start: 2020-11-21 | End: 2020-11-21

## 2020-11-21 RX ADMIN — CEPHALEXIN 500 MG: 500 CAPSULE ORAL at 10:11

## 2020-11-22 NOTE — ED NOTES
Wrapped left hand loosely with dominique prior to discharge after having pt cleanse hands thoroughly with warm, soapy water. Pt dried hands with a towel.

## 2020-11-22 NOTE — ED PROVIDER NOTES
"Encounter Date: 11/21/2020       History     Chief Complaint   Patient presents with    Wound Check     laceration repair to left hand yesterday; to pt ED for antibiotic prescription s/t "it's a little red and swollen and there was some bloody drainage." Pt requesting antibiotics to prevent infection from occurring.    Medication Refill     Patient currently returns with concern regarding potential infection to the left hand.  Patient notes that he recently underwent repair of a laceration that he incurred on the hand after being struck with a 2 x 4.  Patient notes that the wound was draining some cloudy bloody drainage from the site prior to arrival.  He also notes increased pain, redness, and swelling.        Review of patient's allergies indicates:   Allergen Reactions    Ginger     Poison ivy [vit e-nonoxynol 9-aloe vera]      Past Medical History:   Diagnosis Date    Hypertension      History reviewed. No pertinent surgical history.  History reviewed. No pertinent family history.  Social History     Tobacco Use    Smoking status: Never Smoker    Smokeless tobacco: Never Used   Substance Use Topics    Alcohol use: Yes     Comment: mark 1-2 drink     Drug use: Not Currently     Review of Systems   Constitutional: Negative for chills and fever.   HENT: Negative for congestion.    Respiratory: Negative for chest tightness and shortness of breath.    Cardiovascular: Negative for chest pain and leg swelling.   Gastrointestinal: Negative for abdominal pain, constipation, diarrhea, nausea and vomiting.   Genitourinary: Negative for dysuria, frequency and urgency.   Skin: Negative for color change and rash.   Allergic/Immunologic: Negative for immunocompromised state.   Neurological: Negative for weakness and numbness.   Hematological: Negative for adenopathy. Does not bruise/bleed easily.   All other systems reviewed and are negative.      Physical Exam     Initial Vitals [11/21/20 2213]   BP Pulse Resp Temp " SpO2   (!) 151/99 88 16 99.3 °F (37.4 °C) 99 %      MAP       --         Physical Exam    Constitutional: He appears well-developed and well-nourished. He is not diaphoretic. No distress.   HENT:   Head: Normocephalic and atraumatic.   Cardiovascular: Normal rate, regular rhythm and intact distal pulses.   Pulmonary/Chest: Breath sounds normal. No respiratory distress.   Musculoskeletal:        Right hand: Normal sensation noted. Normal strength noted.        Left hand: Normal sensation noted. Normal strength noted.        Hands:    Neurological: He is alert and oriented to person, place, and time.   Skin: Skin is warm and dry.         ED Course   Procedures  Labs Reviewed - No data to display       Imaging Results    None          Medical Decision Making:   ED Management:  All findings were reviewed with the patient/family in detail.  Although it is not completely clear, there is suggestion of infection.  Accordingly we will place the patient on antibiotics pending his ability to seek reassessment.  We will initiate oral antibiotics at this time and have encouraged to have the patient return should he experience any difficulty.  I see no indication of an emergent process beyond that addressed during our encounter but have duly counseled the patient/family regarding the need for prompt follow-up as well as the indications that should prompt immediate return to the emergency room should new or worrisome developments occur.  The patient has additionally been provided with printed information regarding diagnosis as well as instructions regarding follow up and any medications intended to manage the patient's aforementioned conditions.  The patient/family communicates understanding of all this information and all remaining questions and concerns were addressed at this time.                    .               Clinical Impression:       ICD-10-CM ICD-9-CM   1. Laceration of right hand without foreign body, subsequent  encounter  S61.411D V58.89                          ED Disposition Condition    Discharge Stable        ED Prescriptions     Medication Sig Dispense Start Date End Date Auth. Provider    cephALEXin (KEFLEX) 500 MG capsule Take 1 capsule (500 mg total) by mouth every 12 (twelve) hours. for 7 days 14 capsule 11/21/2020 11/28/2020 Melvin Hansen MD        Follow-up Information     Follow up With Specialties Details Why Contact Info    Duane Patel III, MD Family Medicine Schedule an appointment as soon as possible for a visit  for reassessment [address] [facility] [id number] [phone]      [facility] Emergency Medicine Go to  As needed, If symptoms worsen [address] [facility] [address] [phone]                                       Melvin Hansen MD  11/22/20 0532

## 2020-11-22 NOTE — ED NOTES
Explained to pt that 2 patients checked in just prior to his arrival and MD is currently in a procedure with one of those patient's. Understanding verbalized.

## 2020-11-22 NOTE — ED NOTES
"Pt to ED nurse's station stating "what's going on, I should have already been out of here by now."  "

## 2020-12-03 ENCOUNTER — HOSPITAL ENCOUNTER (EMERGENCY)
Facility: HOSPITAL | Age: 60
Discharge: HOME | End: 2020-12-03
Attending: EMERGENCY MEDICINE
Payer: MEDICAID

## 2020-12-03 VITALS
OXYGEN SATURATION: 97 % | HEIGHT: 69 IN | RESPIRATION RATE: 19 BRPM | TEMPERATURE: 99 F | SYSTOLIC BLOOD PRESSURE: 146 MMHG | DIASTOLIC BLOOD PRESSURE: 97 MMHG | BODY MASS INDEX: 36.92 KG/M2 | HEART RATE: 90 BPM

## 2020-12-03 DIAGNOSIS — Z48.02 VISIT FOR SUTURE REMOVAL: Primary | ICD-10-CM

## 2020-12-03 DIAGNOSIS — H60.91 OTITIS EXTERNA OF RIGHT EAR, UNSPECIFIED CHRONICITY, UNSPECIFIED TYPE: ICD-10-CM

## 2020-12-03 PROCEDURE — 99283 EMERGENCY DEPT VISIT LOW MDM: CPT | Mod: ER

## 2020-12-03 RX ORDER — OFLOXACIN 3 MG/ML
5 SOLUTION AURICULAR (OTIC) 2 TIMES DAILY
Qty: 70 DROP | Refills: 0 | Status: SHIPPED | OUTPATIENT
Start: 2020-12-03 | End: 2020-12-10

## 2020-12-04 NOTE — DISCHARGE INSTRUCTIONS
Use ear drops as prescribed for right ear.  Keep your wound clean and dry.  Return to ED if he develops any signs of drainage, redness, swelling, increased pain.  Follow-up with PCP for wound check in the next 3-4 days.

## 2020-12-04 NOTE — ED PROVIDER NOTES
"Encounter Date: 12/3/2020       History     Chief Complaint   Patient presents with    Otalgia     Pt reports right ear pain and needing sutures removed to right hand. Sutures placed X 13 days. PA in triage.    Suture / Staple Removal     Patient is a 60-year-old male who presents to ED for suture removal in with complaints of right ear pain.  Patient reports that he had sutures placed to his left hand 13 days ago for which he sustained from getting hit with a piece of wood. 4 sutures in place to left palm. Patient reports that he has been keeping the wound clean. Denies any redness, drainage, swelling, or pain. Reports that he completed his course of antibiotics.     Also c/o right ear pain x 2-3 days. Reports that his "outer ear" is throbbing. Denies any drainage, fever, chills, sore throat, headaches, neck pain. Reports that he previously has otitis externa a few weeks ago in the left ear, and this feels similar to then.         Review of patient's allergies indicates:   Allergen Reactions    Ginger     Poison ivy [vit e-nonoxynol 9-aloe vera]      Past Medical History:   Diagnosis Date    Hypertension      History reviewed. No pertinent surgical history.  History reviewed. No pertinent family history.  Social History     Tobacco Use    Smoking status: Never Smoker    Smokeless tobacco: Never Used   Substance Use Topics    Alcohol use: Yes     Comment: mark 1-2 drink     Drug use: Not Currently     Review of Systems   Constitutional: Negative for chills and fever.   HENT: Positive for ear pain. Negative for ear discharge, sore throat and trouble swallowing.    Respiratory: Negative for cough and shortness of breath.    Cardiovascular: Negative for chest pain.   Gastrointestinal: Negative for nausea and vomiting.   Musculoskeletal: Negative for back pain, myalgias, neck pain and neck stiffness.   Skin: Positive for wound (sutures to left palm ). Negative for rash.   Neurological: Negative for weakness and " numbness.       Physical Exam     Initial Vitals [12/03/20 1934]   BP Pulse Resp Temp SpO2   (!) 146/97 90 19 99.1 °F (37.3 °C) 97 %      MAP       --         Physical Exam    Nursing note and vitals reviewed.  Constitutional: He appears well-developed and well-nourished. He is not diaphoretic. No distress.   HENT:   Head: Normocephalic and atraumatic.   Right EAC mildly erythematous. No mastoid ttp. TM appears normal bilaterally - no effusion, bulging, or perforation.    Eyes: Conjunctivae and EOM are normal. Pupils are equal, round, and reactive to light.   Neck: Normal range of motion. Neck supple.   Cardiovascular: Normal rate, regular rhythm, normal heart sounds and intact distal pulses.   Pulmonary/Chest: Breath sounds normal. No respiratory distress.   Abdominal: Soft. Bowel sounds are normal. There is no abdominal tenderness.   Musculoskeletal: Normal range of motion. No tenderness or edema.      Comments: FROM of all digits.    Neurological: He is alert and oriented to person, place, and time. He has normal strength.   Skin: Skin is warm. Capillary refill takes less than 2 seconds. No rash noted.   Healing wound to left palm, at the base junction of the MCP joint and palm of the 2nd digit. There appears to be an area in the crease that is not in alignment. No erythema, discharge, induration, or fluctuance.          ED Course   Procedures  Labs Reviewed - No data to display       Imaging Results    None          Medical Decision Making:   ED Management:  Four sutures removed.  Wound clean.  No signs infection.  There is an area in the crease of the MCP joint that appears to still be mildly open.  Steri-Strips applied.  Wound dressing placed.  Discussed wound care with patient at great length.  Advised to follow-up with PCP in the next 3-4 days for wound check.  ED precautions were discussed with patient to return if he develops any signs of infection.  Patient voiced understanding and agreement plan of  care.    There is mild erythema of the right EAC, will give patient Rx for ofloxacin otic solution.  Advised him to follow-up with his PCP for re-evaluation.  ED precautions discussed.  Patient voiced understanding and agreement with plan of care.                             Clinical Impression:       ICD-10-CM ICD-9-CM   1. Visit for suture removal  Z48.02 V58.32   2. Otitis externa of right ear, unspecified chronicity, unspecified type  H60.91 380.10                      Disposition:   Disposition: Discharged  Condition: Stable     ED Disposition Condition    Discharge Stable        ED Prescriptions     Medication Sig Dispense Start Date End Date Auth. Provider    ofloxacin (FLOXIN) 0.3 % otic solution Place 5 drops into the right ear 2 (two) times a day. for 7 days 70 drop 12/3/2020 12/10/2020 Kellie Kaur PA-C        Follow-up Information     Follow up With Specialties Details Why Contact Info    Duane Patel III, MD Family Medicine Schedule an appointment as soon as possible for a visit in 3 days For wound re-check [address] [facility] [address]  [phone]      [facility] Emergency Medicine Go to  If symptoms worsen [address] [facility] [address] [phone]                                       Kellie Kaur PA-C  12/04/20 4759

## 2021-10-10 ENCOUNTER — HOSPITAL ENCOUNTER (EMERGENCY)
Facility: HOSPITAL | Age: 61
Discharge: HOME | End: 2021-10-10
Payer: MEDICAID

## 2021-10-10 VITALS
OXYGEN SATURATION: 96 % | BODY MASS INDEX: 36.88 KG/M2 | WEIGHT: 249 LBS | HEIGHT: 69 IN | RESPIRATION RATE: 17 BRPM | TEMPERATURE: 99 F | DIASTOLIC BLOOD PRESSURE: 95 MMHG | SYSTOLIC BLOOD PRESSURE: 142 MMHG | HEART RATE: 117 BPM

## 2021-10-10 DIAGNOSIS — J06.9 VIRAL URI: Primary | ICD-10-CM

## 2021-10-10 DIAGNOSIS — R14.0 BLOATING: ICD-10-CM

## 2021-10-10 DIAGNOSIS — K59.00 CONSTIPATION, UNSPECIFIED CONSTIPATION TYPE: ICD-10-CM

## 2021-10-10 LAB
BILIRUB UR QL STRIP: NEGATIVE
CLARITY UR REFRACT.AUTO: CLEAR
COLOR UR AUTO: YELLOW
GLUCOSE UR QL STRIP: NEGATIVE
HGB UR QL STRIP: ABNORMAL
INFLUENZA A, MOLECULAR: NEGATIVE
INFLUENZA B, MOLECULAR: NEGATIVE
KETONES UR QL STRIP: NEGATIVE
LEUKOCYTE ESTERASE UR QL STRIP: NEGATIVE
NITRITE UR QL STRIP: NEGATIVE
PH UR STRIP: 5 [PH] (ref 5–8)
PROT UR QL STRIP: ABNORMAL
SARS-COV-2 RDRP RESP QL NAA+PROBE: NEGATIVE
SP GR UR STRIP: 1.02 (ref 1–1.03)
SPECIMEN SOURCE: NORMAL
URN SPEC COLLECT METH UR: ABNORMAL
UROBILINOGEN UR STRIP-ACNC: NEGATIVE EU/DL

## 2021-10-10 PROCEDURE — U0002 COVID-19 LAB TEST NON-CDC: HCPCS | Mod: ER | Performed by: PHYSICIAN ASSISTANT

## 2021-10-10 PROCEDURE — 87502 INFLUENZA DNA AMP PROBE: CPT | Mod: ER | Performed by: PHYSICIAN ASSISTANT

## 2021-10-10 PROCEDURE — 81003 URINALYSIS AUTO W/O SCOPE: CPT | Mod: ER | Performed by: PHYSICIAN ASSISTANT

## 2021-10-10 PROCEDURE — 99284 EMERGENCY DEPT VISIT MOD MDM: CPT | Mod: 25,ER

## 2021-10-10 RX ORDER — DOCUSATE SODIUM 100 MG/1
100 CAPSULE, LIQUID FILLED ORAL 2 TIMES DAILY
Qty: 14 CAPSULE | Refills: 0 | Status: SHIPPED | OUTPATIENT
Start: 2021-10-10 | End: 2021-10-17

## 2022-05-10 ENCOUNTER — HOSPITAL ENCOUNTER (EMERGENCY)
Facility: HOSPITAL | Age: 62
Discharge: HOME | End: 2022-05-10
Attending: EMERGENCY MEDICINE
Payer: MEDICAID

## 2022-05-10 VITALS
SYSTOLIC BLOOD PRESSURE: 141 MMHG | OXYGEN SATURATION: 96 % | RESPIRATION RATE: 15 BRPM | TEMPERATURE: 99 F | DIASTOLIC BLOOD PRESSURE: 88 MMHG | HEART RATE: 104 BPM

## 2022-05-10 DIAGNOSIS — R05.9 COUGH: ICD-10-CM

## 2022-05-10 DIAGNOSIS — J06.9 VIRAL URI WITH COUGH: Primary | ICD-10-CM

## 2022-05-10 LAB
GROUP A STREP, MOLECULAR: NEGATIVE
INFLUENZA A, MOLECULAR: NEGATIVE
INFLUENZA B, MOLECULAR: NEGATIVE
SARS-COV-2 RDRP RESP QL NAA+PROBE: NEGATIVE
SPECIMEN SOURCE: NORMAL

## 2022-05-10 PROCEDURE — U0002 COVID-19 LAB TEST NON-CDC: HCPCS | Mod: ER | Performed by: EMERGENCY MEDICINE

## 2022-05-10 PROCEDURE — 87502 INFLUENZA DNA AMP PROBE: CPT | Mod: ER | Performed by: EMERGENCY MEDICINE

## 2022-05-10 PROCEDURE — 87502 INFLUENZA DNA AMP PROBE: CPT | Mod: ER

## 2022-05-10 PROCEDURE — 87651 STREP A DNA AMP PROBE: CPT | Mod: ER | Performed by: EMERGENCY MEDICINE

## 2022-05-10 PROCEDURE — 99284 EMERGENCY DEPT VISIT MOD MDM: CPT | Mod: 25,ER

## 2022-05-10 RX ORDER — FLUTICASONE PROPIONATE 50 MCG
1 SPRAY, SUSPENSION (ML) NASAL 2 TIMES DAILY PRN
Qty: 15 G | Refills: 0 | Status: SHIPPED | OUTPATIENT
Start: 2022-05-10

## 2022-05-10 RX ORDER — BENZONATATE 100 MG/1
100 CAPSULE ORAL 3 TIMES DAILY PRN
Qty: 10 CAPSULE | Refills: 0 | Status: SHIPPED | OUTPATIENT
Start: 2022-05-10 | End: 2022-05-20

## 2022-05-10 NOTE — DISCHARGE INSTRUCTIONS
Your chest x-ray did not reveal any evidence of pneumonia or consolidation.  The CT of your chest did not reveal any findings concerning of a density from your chest x-ray.  It did reveal some mediastinal lymphadenopathy which she should have re-evaluated by your primary care provider.  You are instructed to return to the emergency department immediately for any new or worsening symptoms.

## 2022-05-10 NOTE — ED PROVIDER NOTES
Encounter Date: 5/10/2022       History     Chief Complaint   Patient presents with    Sinusitis    Cough     I am having sinus drainage and a cough body aches for 4 days.      61-year-old male presents emergency department for evaluation of 4 day history of sinus pressure, nasal congestion, rhinorrhea, cough and generalized body aches.  He reports that the symptoms began gradually 4 days ago and have been intermittent since onset.  He has been attempting treatment at home with Sudafed and Naprosyn with mild relief of symptoms.  Reports that the cough is an intermittently productive cough with thick yellow sputum.  He denies any known fever, headache, dizziness, neck pain, chest pain, shortness of breath, palpitations, abdominal pain, nausea, vomiting, diarrhea or dysuria.          Review of patient's allergies indicates:   Allergen Reactions    Ginger     Poison ivy [vit e-nonoxynol 9-aloe vera]      Past Medical History:   Diagnosis Date    Hypertension      History reviewed. No pertinent surgical history.  History reviewed. No pertinent family history.  Social History     Tobacco Use    Smoking status: Never Smoker    Smokeless tobacco: Never Used   Substance Use Topics    Alcohol use: Yes     Comment: mark 1-2 drink     Drug use: Not Currently     Review of Systems   Constitutional: Negative for activity change, appetite change and fever.   HENT: Positive for congestion, rhinorrhea and sinus pressure. Negative for ear pain, facial swelling, sore throat, trouble swallowing and voice change.    Eyes: Negative for photophobia and visual disturbance.   Respiratory: Positive for cough. Negative for chest tightness, shortness of breath and wheezing.    Cardiovascular: Negative for chest pain.   Gastrointestinal: Negative for abdominal pain, constipation, diarrhea, nausea and vomiting.   Genitourinary: Negative for dysuria.   Musculoskeletal: Negative for back pain, joint swelling and neck pain.   Skin:  Negative for rash.   Neurological: Negative for dizziness, syncope, weakness, light-headedness, numbness and headaches.   Hematological: Does not bruise/bleed easily.       Physical Exam     Initial Vitals [05/10/22 1136]   BP Pulse Resp Temp SpO2   (!) 141/88 104 15 98.6 °F (37 °C) 96 %      MAP       --         Physical Exam    Nursing note and vitals reviewed.  Constitutional: He appears well-developed and well-nourished. He is not diaphoretic. No distress.   HENT:   Head: Normocephalic and atraumatic.   Right Ear: Tympanic membrane, external ear and ear canal normal.   Left Ear: Tympanic membrane, external ear and ear canal normal.   Nose: Mucosal edema present. No sinus tenderness, septal deviation or nasal septal hematoma. No epistaxis. Right sinus exhibits no maxillary sinus tenderness and no frontal sinus tenderness. Left sinus exhibits no maxillary sinus tenderness and no frontal sinus tenderness.   Mouth/Throat: Oropharynx is clear and moist. No oropharyngeal exudate.   Eyes: Conjunctivae and EOM are normal. Pupils are equal, round, and reactive to light.   Neck: Neck supple.   Normal range of motion.  Cardiovascular: Normal rate, regular rhythm and normal heart sounds.   Pulmonary/Chest: Breath sounds normal. No respiratory distress. He has no wheezes. He has no rhonchi. He has no rales. He exhibits no tenderness.   Abdominal: Abdomen is soft. Bowel sounds are normal. He exhibits no distension. There is no abdominal tenderness. There is no guarding.   Musculoskeletal:      Cervical back: Normal range of motion and neck supple.     Lymphadenopathy:     He has no cervical adenopathy.   Neurological: He is alert and oriented to person, place, and time.   Skin: Skin is warm and dry.   Psychiatric: He has a normal mood and affect.         ED Course   Procedures  Labs Reviewed   INFLUENZA A & B BY MOLECULAR   GROUP A STREP, MOLECULAR   SARS-COV-2 RNA AMPLIFICATION, QUAL    Narrative:     Is the patient  symptomatic?->Yes          Imaging Results          CT Chest Without Contrast (Final result)  Result time 05/10/22 13:25:28    Final result by LUIS Otero Sr., MD (05/10/22 13:25:28)                 Impression:      1. The lungs are clear.  2. There are prominent size mediastinal lymph nodes. One of the larger ones is located along the left side of the distal aspect of the trachea.  It has a short axis measurement of 10 mm.  3. There was no finding explanatory for the subtle density seen on the chest x-ray.  All CT scans at this facility use dose modulation, iterative reconstruction, and/or weight base dosing when appropriate to reduce radiation dose when appropriate to reduce radiation dose to as low as reasonably achievable.      Electronically signed by: Kishan Otero MD  Date:    05/10/2022  Time:    13:25             Narrative:    EXAMINATION:  CT CHEST WITHOUT CONTRAST    CLINICAL HISTORY:  Cough, persistent;Abnormal xray - lung nodule, < 1 cm, mod-high risk;    TECHNIQUE:  Standard chest CT protocol was performed without IV contrast.    COMPARISON:  A chest x-ray performed on 05/10/2022.    FINDINGS:  The size of heart is within normal limits.  There is a mild amount of atherosclerosis.  There are prominent size mediastinal lymph nodes.  One of the larger ones is located along the left side of the distal aspect of the trachea.  It has a short axis measurement of 10 mm.  The lungs are clear. There is no pneumothorax or pleural effusion.  There is no bone island visualized.  There was no finding explanatory for the subtle density seen on the chest x-ray.                               X-Ray Chest PA And Lateral (Final result)  Result time 05/10/22 12:20:16    Final result by LUIS Otero Sr., MD (05/10/22 12:20:16)                 Impression:      1. There is no focal pulmonary infiltrate visualized.  2. There is an 11 mm oval shaped area of increased density projected over the right clavicle and the  right 1st rib on the frontal view. This is not visualized on the lateral view.  If additional imaging evaluation is clinically indicated, I recommend consideration of a CT examination of the thorax without IV contrast.  .      Electronically signed by: Kishan Otero MD  Date:    05/10/2022  Time:    12:20             Narrative:    EXAMINATION:  XR CHEST PA AND LATERAL    CLINICAL HISTORY:  Cough, unspecified    COMPARISON:  10/10/2021    FINDINGS:  The size and contour of the heart are normal.  There is no focal pulmonary infiltrate visualized.  There is an 11 mm oval shaped area of increased density projected over the right clavicle and the right 1st rib on the frontal view.  This is not visualized on the lateral view.  There is no pneumothorax or pleural effusion.                                 Medications - No data to display  Medical Decision Making:   Initial Assessment:   61-year-old male presents emergency department for evaluation of nasal congestion, sinus pressure, rhinorrhea and intermittently productive cough.  Physical exam reveals a nontoxic-appearing male in no acute distress.  Patient is afebrile vital signs within normal limits.  Neurological exam reveals an alert and oriented patient.  TMs reveal no erythema.  Nasal turbinates mildly boggy, no purulent discharge noted.  Posterior pharynx reveals mild erythema, no edema or tonsillar exudate noted.  No uvular edema or deviation noted.  Neck is supple, no meningeal signs noted.  Lungs clear to auscultation bilaterally.  Abdominal exam reveals soft abdomen, nontender to palpation.  No CVA tenderness noted.  Differential Diagnosis:   Viral URI  COVID-19  Influenza  Streptococcal pharyngitis  Chest x-ray ordered to assess possible pneumonia or consolidation.  ED Management:  Rapid COVID negative.  Influenza negative.  Group a strep negative.  Chest x-ray report reveals no focal pulmonary infiltrate visualized.  There is 11 mm oval-shaped area of  increased density projected over the right clavicle and the 1st rib on the frontal view, not visualized in the lateral view.  Suggested CT without contrast.  This was ordered in the emergency department after I discussed the x-ray findings with the patient.  CT reports that the lungs are clear.  There predominant size mediastinal lymph nodes.  One of the larger ones is located along the left side of the distal aspect of the trachea.  Under no finding explanatory for the subtle density seen on the chest x-ray.  Discussed these findings at length patient verbalizes understanding and agreement course of treatment.  Instructed the patient to follow up with his primary care provider for re-evaluation and to return to the emergency department immediately for any new or worsening symptoms.  Discussed this patient at length with Dr. Bright who is in agreement course of treatment.                      Clinical Impression:   Final diagnoses:  [R05.9] Cough  [J06.9] Viral URI with cough (Primary)          ED Disposition Condition    Discharge Stable        ED Prescriptions     Medication Sig Dispense Start Date End Date Auth. Provider    benzonatate (TESSALON) 100 MG capsule Take 1 capsule (100 mg total) by mouth 3 (three) times daily as needed. 10 capsule 5/10/2022 5/20/2022 Gale Alfaro PA-C    fluticasone propionate (FLONASE) 50 mcg/actuation nasal spray 1 spray (50 mcg total) by Each Nostril route 2 (two) times daily as needed. 15 g 5/10/2022  Gale Alfaro PA-C        Follow-up Information     Follow up With Specialties Details Why Contact Info    Duane Patel III, MD Family Medicine In 3 days  [address] [facility] [place] [address]  [phone]             Gale Alfaro PA-C  05/10/22 0588

## 2024-07-02 DIAGNOSIS — R60.0 LOWER EXTREMITY EDEMA: Primary | ICD-10-CM

## 2024-07-03 ENCOUNTER — HOSPITAL ENCOUNTER (OUTPATIENT)
Dept: RADIOLOGY | Facility: HOSPITAL | Age: 64
Discharge: HOME | End: 2024-07-03
Attending: NURSE PRACTITIONER
Payer: MEDICAID

## 2024-07-03 DIAGNOSIS — R60.0 LOWER EXTREMITY EDEMA: ICD-10-CM

## 2024-07-03 PROCEDURE — 93970 EXTREMITY STUDY: CPT | Mod: TC,PN

## 2024-07-03 PROCEDURE — 93970 EXTREMITY STUDY: CPT | Mod: 26,,, | Performed by: RADIOLOGY

## 2024-11-16 ENCOUNTER — HOSPITAL ENCOUNTER (EMERGENCY)
Facility: HOSPITAL | Age: 64
Discharge: HOME | End: 2024-11-16
Attending: STUDENT IN AN ORGANIZED HEALTH CARE EDUCATION/TRAINING PROGRAM
Payer: MEDICAID

## 2024-11-16 VITALS
TEMPERATURE: 99 F | HEART RATE: 83 BPM | OXYGEN SATURATION: 98 % | RESPIRATION RATE: 20 BRPM | HEIGHT: 69 IN | BODY MASS INDEX: 38.8 KG/M2 | SYSTOLIC BLOOD PRESSURE: 136 MMHG | WEIGHT: 262 LBS | DIASTOLIC BLOOD PRESSURE: 92 MMHG

## 2024-11-16 DIAGNOSIS — L30.9 DERMATITIS: ICD-10-CM

## 2024-11-16 DIAGNOSIS — T50.905A ADVERSE EFFECT OF DRUG, INITIAL ENCOUNTER: Primary | ICD-10-CM

## 2024-11-16 DIAGNOSIS — R22.41 LOCALIZED SWELLING OF RIGHT LOWER LEG: ICD-10-CM

## 2024-11-16 LAB
ALBUMIN SERPL BCP-MCNC: 4.2 G/DL (ref 3.5–5.2)
ALP SERPL-CCNC: 63 U/L (ref 38–126)
ALT SERPL W/O P-5'-P-CCNC: 45 U/L (ref 10–44)
ANION GAP SERPL CALC-SCNC: 6 MMOL/L (ref 8–16)
AST SERPL-CCNC: 42 U/L (ref 15–46)
BASOPHILS # BLD AUTO: 0.06 K/UL (ref 0–0.2)
BASOPHILS NFR BLD: 0.9 % (ref 0–1.9)
BILIRUB SERPL-MCNC: 0.4 MG/DL (ref 0.1–1)
CALCIUM SERPL-MCNC: 9.1 MG/DL (ref 8.7–10.5)
CHLORIDE SERPL-SCNC: 106 MMOL/L (ref 95–110)
CO2 SERPL-SCNC: 30 MMOL/L (ref 23–29)
CREAT SERPL-MCNC: 1.23 MG/DL (ref 0.5–1.4)
DIFFERENTIAL METHOD BLD: ABNORMAL
EOSINOPHIL # BLD AUTO: 0.7 K/UL (ref 0–0.5)
EOSINOPHIL NFR BLD: 10.1 % (ref 0–8)
ERYTHROCYTE [DISTWIDTH] IN BLOOD BY AUTOMATED COUNT: 13 % (ref 11.5–14.5)
EST. GFR  (NO RACE VARIABLE): >60 ML/MIN/1.73 M^2
GLUCOSE SERPL-MCNC: 101 MG/DL (ref 70–110)
HCT VFR BLD AUTO: 37.7 % (ref 40–54)
HGB BLD-MCNC: 12.7 G/DL (ref 14–18)
IMM GRANULOCYTES # BLD AUTO: 0.06 K/UL (ref 0–0.04)
IMM GRANULOCYTES NFR BLD AUTO: 0.9 % (ref 0–0.5)
LYMPHOCYTES # BLD AUTO: 2.6 K/UL (ref 1–4.8)
LYMPHOCYTES NFR BLD: 37.4 % (ref 18–48)
MCH RBC QN AUTO: 31.8 PG (ref 27–31)
MCHC RBC AUTO-ENTMCNC: 33.7 G/DL (ref 32–36)
MCV RBC AUTO: 95 FL (ref 82–98)
MONOCYTES # BLD AUTO: 0.5 K/UL (ref 0.3–1)
MONOCYTES NFR BLD: 7.3 % (ref 4–15)
NEUTROPHILS # BLD AUTO: 3 K/UL (ref 1.8–7.7)
NEUTROPHILS NFR BLD: 43.4 % (ref 38–73)
NRBC BLD-RTO: 0 /100 WBC
PLATELET # BLD AUTO: 197 K/UL (ref 150–450)
PMV BLD AUTO: 9.4 FL (ref 9.2–12.9)
POTASSIUM SERPL-SCNC: 3.8 MMOL/L (ref 3.5–5.1)
PROT SERPL-MCNC: 7.1 G/DL (ref 6–8.4)
RBC # BLD AUTO: 3.99 M/UL (ref 4.6–6.2)
SODIUM SERPL-SCNC: 142 MMOL/L (ref 136–145)
UUN UR-MCNC: 22 MG/DL (ref 2–20)
WBC # BLD AUTO: 6.81 K/UL (ref 3.9–12.7)

## 2024-11-16 PROCEDURE — 80053 COMPREHEN METABOLIC PANEL: CPT | Mod: ER | Performed by: PHYSICIAN ASSISTANT

## 2024-11-16 PROCEDURE — 85025 COMPLETE CBC W/AUTO DIFF WBC: CPT | Mod: ER | Performed by: PHYSICIAN ASSISTANT

## 2024-11-16 PROCEDURE — 99284 EMERGENCY DEPT VISIT MOD MDM: CPT | Mod: 25,ER

## 2024-11-16 RX ORDER — PREDNISONE 20 MG/1
40 TABLET ORAL DAILY
Qty: 10 TABLET | Refills: 0 | Status: SHIPPED | OUTPATIENT
Start: 2024-11-16 | End: 2024-11-21

## 2024-11-16 RX ORDER — HYDROXYZINE HYDROCHLORIDE 25 MG/1
25 TABLET, FILM COATED ORAL EVERY 6 HOURS
Qty: 12 TABLET | Refills: 0 | Status: SHIPPED | OUTPATIENT
Start: 2024-11-16

## 2024-11-16 RX ORDER — TRIAMCINOLONE ACETONIDE 1 MG/G
OINTMENT TOPICAL 2 TIMES DAILY
Qty: 80 G | Refills: 0 | Status: SHIPPED | OUTPATIENT
Start: 2024-11-16
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
## 2025-08-21 ENCOUNTER — HOSPITAL ENCOUNTER (EMERGENCY)
Facility: HOSPITAL | Age: 65
Discharge: HOME | End: 2025-08-21
Attending: EMERGENCY MEDICINE
Payer: MEDICAID

## 2025-08-21 VITALS
OXYGEN SATURATION: 95 % | RESPIRATION RATE: 18 BRPM | BODY MASS INDEX: 39.99 KG/M2 | HEIGHT: 69 IN | TEMPERATURE: 98 F | DIASTOLIC BLOOD PRESSURE: 81 MMHG | WEIGHT: 270 LBS | HEART RATE: 76 BPM | SYSTOLIC BLOOD PRESSURE: 125 MMHG
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
## 2025-08-21 PROCEDURE — 99283 EMERGENCY DEPT VISIT LOW MDM: CPT | Mod: 25,ER

## 2025-08-21 PROCEDURE — 25000003 PHARM REV CODE 250: Mod: ER | Performed by: EMERGENCY MEDICINE

## 2025-08-21 RX ORDER — AMOXICILLIN AND CLAVULANATE POTASSIUM 875; 125 MG/1; MG/1
1 TABLET, FILM COATED ORAL 2 TIMES DAILY
Qty: 6 TABLET | Refills: 0 | Status: SHIPPED | OUTPATIENT
Start: 2025-08-21 | End: 2025-08-24

## 2025-08-21 RX ORDER — AMOXICILLIN AND CLAVULANATE POTASSIUM 875; 125 MG/1; MG/1
1 TABLET, FILM COATED ORAL
Status: COMPLETED | OUTPATIENT
Start: 2025-08-21 | End: 2025-08-21

## 2025-08-21 RX ADMIN — AMOXICILLIN AND CLAVULANATE POTASSIUM 1 TABLET: 875; 125 TABLET, FILM COATED ORAL at 04:08
